# Patient Record
Sex: FEMALE | URBAN - METROPOLITAN AREA
[De-identification: names, ages, dates, MRNs, and addresses within clinical notes are randomized per-mention and may not be internally consistent; named-entity substitution may affect disease eponyms.]

---

## 2020-03-12 ENCOUNTER — EMERGENCY (EMERGENCY)
Facility: HOSPITAL | Age: 25
LOS: 1 days | Discharge: HOME | End: 2020-03-12
Attending: EMERGENCY MEDICINE
Payer: MEDICAID

## 2020-03-12 VITALS
DIASTOLIC BLOOD PRESSURE: 75 MMHG | OXYGEN SATURATION: 98 % | HEART RATE: 92 BPM | RESPIRATION RATE: 18 BRPM | SYSTOLIC BLOOD PRESSURE: 111 MMHG | TEMPERATURE: 99 F

## 2020-03-12 VITALS
OXYGEN SATURATION: 97 % | SYSTOLIC BLOOD PRESSURE: 129 MMHG | TEMPERATURE: 99 F | HEIGHT: 68 IN | HEART RATE: 116 BPM | WEIGHT: 190.04 LBS | RESPIRATION RATE: 16 BRPM | DIASTOLIC BLOOD PRESSURE: 83 MMHG

## 2020-03-12 LAB — RAPID RVP RESULT: SIGNIFICANT CHANGE UP

## 2020-03-12 PROCEDURE — 99283 EMERGENCY DEPT VISIT LOW MDM: CPT

## 2020-03-12 PROCEDURE — 87581 M.PNEUMON DNA AMP PROBE: CPT

## 2020-03-12 PROCEDURE — 87798 DETECT AGENT NOS DNA AMP: CPT

## 2020-03-12 PROCEDURE — 87486 CHLMYD PNEUM DNA AMP PROBE: CPT

## 2020-03-12 PROCEDURE — 87633 RESP VIRUS 12-25 TARGETS: CPT

## 2020-03-12 RX ORDER — ACETAMINOPHEN 500 MG
975 TABLET ORAL ONCE
Refills: 0 | Status: COMPLETED | OUTPATIENT
Start: 2020-03-12 | End: 2020-03-12

## 2020-03-12 RX ADMIN — Medication 975 MILLIGRAM(S): at 18:13

## 2020-03-12 RX ADMIN — Medication 975 MILLIGRAM(S): at 15:34

## 2020-03-12 NOTE — ED PROVIDER NOTE - PROGRESS NOTE DETAILS
RVP negative - will advise self isolation for low suspicion of COVID19 although did not meet criteria for testing. pt was informed, verbalized understanding. - Natanael Funes PA-C RVP negative - will advise self isolation for low suspicion of COVID19 although did not meet criteria for testing. pt was informed, verbalized understanding. Info entered in [employer].- Natanael Funes PA-C

## 2020-03-12 NOTE — ED PROVIDER NOTE - NSFOLLOWUPINSTRUCTIONS_ED_ALL_ED_FT
Please follow up with your primary care doctor within 1 week.    Stay well hydrated with water and electrolyte replacement solutions such as Pedialyte or the adult equivalent.    Take Tylenol 650mg every 6 hrs as needed for pain.  Take Motrin 400-600mg every 6 hrs as needed for pain. Take with food    Return to the ED for worsening symptoms, shortness of breath, chest pain, loss of consciousness, inability to tolerate food/drink, or any other concerns. YOU MUST REMAIN ISOLATED AT HOME FOR 14 DAYS MINIMUM UNTIL TOLD OTHERWISE TO PREVENT FURTHER SPREAD OF THIS DISEASE. ANYONE WHO LIVES WITH YOU MUST ALSO SELF ISOLATE. YOU WILL RECEIVE A CALL FOR BOTH NEGATIVE AND POSITIVE RESULTS. PLEASE READ ATTACHED MATERIAL.    For fever/body aches:  Take Motrin 400-600mg every 6 hrs as needed for pain. Take with food  Take Tylenol 650mg every 6 hrs as needed for pain.    For cough:  Mucinex 600mg twice daily with lots of water    Stay well hydrated with water and electrolyte replacement solutions such as Pedialyte or the adult equivalent.     Return to the ED for worsening symptoms, shortness of breath, chest pain, inability to tolerate food/drink, loss of consciousness, or any other serious concerns.

## 2020-03-12 NOTE — ED PROVIDER NOTE - OBJECTIVE STATEMENT
24y f PMHx DM2 on metformin p/w fever, cough and body aches. pt reports last night (<24hrs ago) she had subjective fever at home and cough productive of green sputum. She also admits to runny nose, sore throat, tender LAD, and headache. Last OTC med tylenol taken yesterday w minimal relief. Pt has a co-worker who returned from [place] 1 month ago, was quarantined for 2-3 weeks, just returned from work. Pt unaware if her coworker was tested or if she had any symptoms. Pt denies any known contacts with COVID19 and denies recent travel out of the country. Denies smoking/vaping. Denies SOB, CP, dizziness, LOC, abd pain, N/V/D/C, myalgias, or urinary symptoms. 24y f PMHx DM2 on metformin p/w fever, cough and body aches. pt reports last night (<24hrs ago) she had subjective fever at home and cough productive of green sputum. She also admits to runny nose, sore throat, tender LAD, and headache. Last OTC med tylenol taken yesterday w minimal relief. Pt has a co-worker who returned from [place] 1 month ago, was quarantined for 2-3 weeks, just returned from work. Pt unaware if her coworker was tested or if she had any symptoms. Pt denies any known contacts with COVID19 and denies recent travel out of the country. Denies smoking/vaping. Denies SOB, CP, dizziness, LOC, abd pain, N/V/D/C, myalgias, or urinary symptoms.      Attn - pt seen in [facility] room.  agree with above - no identified risks for COVID-19.  pt had co-worker that travelled to [place] and self quarantined and did not have coronavirus.

## 2020-03-12 NOTE — ED ADULT NURSE NOTE - OBJECTIVE STATEMENT
1445 25 y/o f to [place] from [place] alone  w/mask on w/c/o cough/fever/chills/l sided neck pain and head pain x1d. was exposed to a co-worked who went to [place] on vacation. and now is self quarantined. no sob or diff breathing noted.

## 2020-03-12 NOTE — ED PROVIDER NOTE - PATIENT PORTAL LINK FT
You can access the FollowMyHealth Patient Portal offered by [facility] by registering at the following website: [email or web address]. By joining [facility]’s FollowMyHealth portal, you will also be able to view your health information using other applications (apps) compatible with our system.